# Patient Record
Sex: FEMALE | Race: OTHER | URBAN - METROPOLITAN AREA
[De-identification: names, ages, dates, MRNs, and addresses within clinical notes are randomized per-mention and may not be internally consistent; named-entity substitution may affect disease eponyms.]

---

## 2022-10-05 ENCOUNTER — EMERGENCY (EMERGENCY)
Facility: HOSPITAL | Age: 37
LOS: 0 days | Discharge: HOME | End: 2022-10-05
Attending: EMERGENCY MEDICINE

## 2022-10-05 VITALS
RESPIRATION RATE: 19 BRPM | OXYGEN SATURATION: 99 % | HEIGHT: 62 IN | WEIGHT: 128.97 LBS | DIASTOLIC BLOOD PRESSURE: 63 MMHG | SYSTOLIC BLOOD PRESSURE: 122 MMHG | HEART RATE: 83 BPM | TEMPERATURE: 98 F

## 2022-10-05 DIAGNOSIS — Z90.49 ACQUIRED ABSENCE OF OTHER SPECIFIED PARTS OF DIGESTIVE TRACT: Chronic | ICD-10-CM
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
## 2022-10-05 DIAGNOSIS — S60.414A ABRASION OF RIGHT RING FINGER, INITIAL ENCOUNTER: ICD-10-CM

## 2022-10-05 DIAGNOSIS — Z23 ENCOUNTER FOR IMMUNIZATION: ICD-10-CM
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
## 2022-10-05 PROCEDURE — 99283 EMERGENCY DEPT VISIT LOW MDM: CPT

## 2022-10-05 RX ORDER — ACETAMINOPHEN 500 MG
650 TABLET ORAL ONCE
Refills: 0 | Status: COMPLETED | OUTPATIENT
Start: 2022-10-05 | End: 2022-10-05

## 2022-10-05 RX ORDER — ACETAMINOPHEN 500 MG
2 TABLET ORAL
Qty: 36 | Refills: 0
Start: 2022-10-05 | End: 2022-10-07

## 2022-10-05 RX ORDER — TETANUS TOXOID, REDUCED DIPHTHERIA TOXOID AND ACELLULAR PERTUSSIS VACCINE, ADSORBED 5; 2.5; 8; 8; 2.5 [IU]/.5ML; [IU]/.5ML; UG/.5ML; UG/.5ML; UG/.5ML
0.5 SUSPENSION INTRAMUSCULAR ONCE
Refills: 0 | Status: COMPLETED | OUTPATIENT
Start: 2022-10-05 | End: 2022-10-05

## 2022-10-05 RX ADMIN — TETANUS TOXOID, REDUCED DIPHTHERIA TOXOID AND ACELLULAR PERTUSSIS VACCINE, ADSORBED 0.5 MILLILITER(S): 5; 2.5; 8; 8; 2.5 SUSPENSION INTRAMUSCULAR at 07:41

## 2022-10-05 RX ADMIN — Medication 650 MILLIGRAM(S): at 07:41

## 2022-10-05 RX ADMIN — Medication 1 TABLET(S): at 07:41

## 2022-10-05 NOTE — ED PROVIDER NOTE - PHYSICAL EXAMINATION
Skin- + 0.5 cm indentation to the right 4th finger nail and abrasion cutaneously 0.3 cm continued from the indentation line of the nail to the base of the base of the nail of the right 4th finger ventral surface no active bleeding

## 2022-10-05 NOTE — ED PROVIDER NOTE - PATIENT PORTAL LINK FT
You can access the FollowMyHealth Patient Portal offered by [facility] by registering at the following website: [email or web address]. By joining [facility]’s FollowMyHealth portal, you will also be able to view your health information using other applications (apps) compatible with our system.

## 2022-10-05 NOTE — ED PROVIDER NOTE - CONSTITUTIONAL, MLM
Well appearing, awake, alert, oriented to person, place, time/situation and in no apparent distress. Speaking in clear full sentences smiling appears very comfortable normal...

## 2022-10-05 NOTE — ED PROVIDER NOTE - CROS ED GU ALL NEG
HPI     New Patient  Chief complaint: poked in the right eye with candy stick   Onset: about 3 weeks ago  Patient states that after the incident the eye hurt and a black dot   formed.  Patient states that eye was painful for 1 to 2 days and condition seemed   to get better   Patient has not used any eye drops  Patient states that she rubbed her eye about 1 week ago and felt a little   pain and wanted to come in to make sure that the was fine.  Patient is not experiencing any pain at this time  No watering   No itching  No blurred vision  No matting in the AM  Patient does not use any vision correction      Last edited by Edu Colunga MA on 11/21/2017  1:54 PM. (History)            Assessment /Plan     For exam results, see Encounter Report.    Pain of right eye      Her right eye looks and feels normal today.  Perhaps she had a NATANAEL which has cleared.  Reassurance given.  RTC prn.                 
negative...

## 2022-10-05 NOTE — ED ADULT NURSE NOTE - OBJECTIVE STATEMENT
pt w lac to left 4th finger, after slicing this morning while cutting lettuce. bleeding controlled at this time. Pt's tetanus not up to date. pain 5/10

## 2022-10-05 NOTE — ED PROVIDER NOTE - OBJECTIVE STATEMENT
36 years old female c/o a cut by the  to the tip of the right 4th finger on the nail this morning, Pt denies focal/distal weakness or numbness, pt does not know her tetanus status. Pt sts she is not at risk of being pregnant.

## 2022-10-05 NOTE — ED PROVIDER NOTE - UPPER EXTREMITY EXAM, RIGHT
nontender to palp pt is able to actively flex/extend all joints of the right 4th finger against resistance/normal

## 2022-10-05 NOTE — ED ADULT NURSE NOTE - NSIMPLEMENTINTERV_GEN_ALL_ED
Implemented All Universal Safety Interventions:  Avis to call system. Call bell, personal items and telephone within reach. Instruct patient to call for assistance. Room bathroom lighting operational. Non-slip footwear when patient is off stretcher. Physically safe environment: no spills, clutter or unnecessary equipment. Stretcher in lowest position, wheels locked, appropriate side rails in place.

## 2022-10-05 NOTE — ED PROVIDER NOTE - CLINICAL SUMMARY MEDICAL DECISION MAKING FREE TEXT BOX
hx, exam, 0.3 cm abrasion linear at base of the right 4th finger and indentation line to the nail No sutures are indicated now full range fo the motion against resistance  pt will be treated by augment pt is advised to return if spreading redness or pus drainage.

## 2022-10-05 NOTE — ED PROVIDER NOTE - EXTREMITY EXAM
right upper extremity findings Advancement-Rotation Flap Text: The defect edges were debeveled with a #15 scalpel blade.  Given the location of the defect, shape of the defect and the proximity to free margins an advancement-rotation flap was deemed most appropriate.  Using a sterile surgical marker, an appropriate flap was drawn incorporating the defect and placing the expected incisions within the relaxed skin tension lines where possible. The area thus outlined was incised deep to adipose tissue with a #15 scalpel blade.  The skin margins were undermined to an appropriate distance in all directions utilizing iris scissors.